# Patient Record
Sex: MALE
[De-identification: names, ages, dates, MRNs, and addresses within clinical notes are randomized per-mention and may not be internally consistent; named-entity substitution may affect disease eponyms.]

---

## 2022-08-09 PROBLEM — Z00.00 ENCOUNTER FOR PREVENTIVE HEALTH EXAMINATION: Status: ACTIVE | Noted: 2022-08-09

## 2022-08-12 ENCOUNTER — APPOINTMENT (OUTPATIENT)
Dept: SURGERY | Facility: CLINIC | Age: 72
End: 2022-08-12

## 2022-08-12 VITALS
TEMPERATURE: 97.5 F | HEART RATE: 57 BPM | WEIGHT: 202 LBS | HEIGHT: 74 IN | OXYGEN SATURATION: 99 % | BODY MASS INDEX: 25.93 KG/M2 | DIASTOLIC BLOOD PRESSURE: 76 MMHG | SYSTOLIC BLOOD PRESSURE: 116 MMHG

## 2022-08-12 DIAGNOSIS — K42.9 UMBILICAL HERNIA W/OUT OBSTRUCTION OR GANGRENE: ICD-10-CM

## 2022-08-12 DIAGNOSIS — I51.9 HEART DISEASE, UNSPECIFIED: ICD-10-CM

## 2022-08-12 PROCEDURE — 99214 OFFICE O/P EST MOD 30 MIN: CPT

## 2022-08-18 NOTE — ASSESSMENT
[FreeTextEntry1] : Case discussed/pt seen with attending, . 73 y/o male with asymptomatic umbilical hernia. We discussed at this time reasonable to hold off on surgery as he is asymptomatic and hernia is small and not enlarging. Discussed f/u in 1 year. Discussed sooner if he develops any new or worsening symptoms. All questions answered. Notably greater than 50% of today's 30 minute visit was spent on counseling and coordination of patients care.

## 2022-08-18 NOTE — PHYSICAL EXAM
[Oriented to Person] : oriented to person [Oriented to Place] : oriented to place [Oriented to Time] : oriented to time [Calm] : calm [Abdominal Masses] : No abdominal masses [Abdomen Tenderness] : ~T ~M No abdominal tenderness [Tender] : was nontender [Enlarged] : not enlarged [de-identified] : NAD, comfortable [de-identified] : Normocephalic, atraumatic. No scleral icterus.  [de-identified] : Supple, no JVD or cervical lymphadenopathy.  [de-identified] : No respiratory distress  [de-identified] : +BS soft, nontender, nondistended. Small umbilical hernia, reducible and nontender.

## 2022-08-18 NOTE — HISTORY OF PRESENT ILLNESS
[de-identified] : This is a 71 y/o male presenting to the office for evaluation and management of a umbilical hernia. Reports he first noticed a bulge at the umbilicus 1-2 years ago. Denies any pain or discomfort. Denies any change in hernia size. Reports he was referred here by his pcp to be further evaluated.  Reports he plays golf and goes for walks often with no issues or pain/discomfort. Hernia not affected daily activities. Recent colonoscopy last week. Denies any urinary or obstructive symptoms. \par